# Patient Record
Sex: MALE | Race: BLACK OR AFRICAN AMERICAN | NOT HISPANIC OR LATINO | Employment: UNEMPLOYED | URBAN - METROPOLITAN AREA
[De-identification: names, ages, dates, MRNs, and addresses within clinical notes are randomized per-mention and may not be internally consistent; named-entity substitution may affect disease eponyms.]

---

## 2017-11-03 ENCOUNTER — TELEPHONE (OUTPATIENT)
Dept: GASTROENTEROLOGY | Facility: CLINIC | Age: 22
End: 2017-11-03

## 2017-11-03 NOTE — TELEPHONE ENCOUNTER
----- Message from Shruti Braxtonkert sent at 11/3/2017  9:36 AM CDT -----  Contact: karthik molina pediatric hospitalist/[facility] [phone]  lammi - visceral myopathy - wants to talk to you about his case  - please call karthik molina pediatric hospitalist/[facility] [phone]

## 2017-11-03 NOTE — TELEPHONE ENCOUNTER
PT w chronic intestinal pseudo-obstruction at [facility]. Unable to fu w Dr. Carbajal   Pls bring this pt into clinic next available.  Put him on a wait list   He will fu w his pediatric Gi until able to be seen in our clinic  Let ref MD know that we will see the pt     Call  to arrange apt [phone]

## 2017-11-06 ENCOUNTER — TELEPHONE (OUTPATIENT)
Dept: GASTROENTEROLOGY | Facility: CLINIC | Age: 22
End: 2017-11-06

## 2017-11-06 NOTE — TELEPHONE ENCOUNTER
----- Message from Ashley Barber sent at 11/6/2017  3:18 PM CST -----  Contact: Self- [phone]  Kayleigh pt is returning a missed call to Beatriz- please call pt back at [phone]

## 2017-11-06 NOTE — TELEPHONE ENCOUNTER
Spoke with GM and arranged new patient appointment.    Appointment was confirmed and appt slip mailed.    Patient has been added on wait list for future cancellation.

## 2018-01-15 ENCOUNTER — DOCUMENTATION ONLY (OUTPATIENT)
Dept: GASTROENTEROLOGY | Facility: CLINIC | Age: 23
End: 2018-01-15

## 2018-01-15 RX ORDER — METRONIDAZOLE 500 MG/1
TABLET ORAL
Refills: 0 | Status: ON HOLD | COMMUNITY
Start: 2017-11-04 | End: 2021-05-20 | Stop reason: HOSPADM

## 2018-01-15 RX ORDER — ERGOCALCIFEROL 1.25 MG/1
CAPSULE ORAL
Refills: 2 | Status: ON HOLD | COMMUNITY
Start: 2017-10-11 | End: 2023-07-31 | Stop reason: HOSPADM

## 2018-01-16 ENCOUNTER — TELEPHONE (OUTPATIENT)
Dept: GASTROENTEROLOGY | Facility: CLINIC | Age: 23
End: 2018-01-16

## 2018-01-16 NOTE — TELEPHONE ENCOUNTER
----- Message from Shruti Dimas sent at 1/16/2018  9:43 AM CST -----  Contact: gracie terry - grandmother - [phone]  flashmi - having his records transferred from [facility] to here - has questions - please call gracie terry - grandmother - [phone]

## 2018-01-16 NOTE — TELEPHONE ENCOUNTER
Spoke with patient's grandmother and gave her directions to [facility] on [facility].    Patient verbalizes understanding and will call if she needs any directions on Thursday.

## 2018-01-18 ENCOUNTER — TELEPHONE (OUTPATIENT)
Dept: GASTROENTEROLOGY | Facility: CLINIC | Age: 23
End: 2018-01-18

## 2018-01-18 NOTE — TELEPHONE ENCOUNTER
Spoke to Dequan, let him know due to weather we are rescheduling patients anyway.  Let him know he will receive a call from Dr. Valdes's assistant next week with his new appt time.     He verbalizes understanding and appreciates update.

## 2018-01-18 NOTE — TELEPHONE ENCOUNTER
----- Message from Karoline Hernandez MA sent at 1/18/2018  8:30 AM CST -----  Contact: Mobile: [phone]   [facility]  Due to weather, pt is not able to make the drive in today, please call to reschedule. Mobile: [phone]

## 2018-01-20 ENCOUNTER — TELEPHONE (OUTPATIENT)
Dept: SPORTS MEDICINE | Facility: CLINIC | Age: 23
End: 2018-01-20

## 2018-01-31 ENCOUNTER — TELEPHONE (OUTPATIENT)
Dept: GASTROENTEROLOGY | Facility: CLINIC | Age: 23
End: 2018-01-31

## 2018-01-31 NOTE — TELEPHONE ENCOUNTER
Patient's appointment rescheduled with Dr. Valdes.    Appointment confirmed and appointment slip mailed out.

## 2018-02-01 NOTE — TELEPHONE ENCOUNTER
Spoke with patient and rescheduled appointment from yesterday to a new day in march due to grandmother having to work that day.    Appointment confirmed and slip mailed out.

## 2018-03-26 ENCOUNTER — TELEPHONE (OUTPATIENT)
Dept: GASTROENTEROLOGY | Facility: CLINIC | Age: 23
End: 2018-03-26

## 2018-03-26 NOTE — TELEPHONE ENCOUNTER
----- Message from Shruti Dimas sent at 3/26/2018 12:37 PM CDT -----  Contact: self - [phone]  flashmi - returning your call - please call patient at

## 2018-03-27 ENCOUNTER — OFFICE VISIT (OUTPATIENT)
Dept: GASTROENTEROLOGY | Facility: CLINIC | Age: 23
End: 2018-03-27
Payer: MEDICAID

## 2018-03-27 VITALS
SYSTOLIC BLOOD PRESSURE: 90 MMHG | HEIGHT: 63 IN | HEART RATE: 81 BPM | DIASTOLIC BLOOD PRESSURE: 67 MMHG | BODY MASS INDEX: 16.83 KG/M2 | WEIGHT: 95 LBS

## 2018-03-27 DIAGNOSIS — R10.32 LEFT LOWER QUADRANT PAIN: Primary | ICD-10-CM

## 2018-03-27 DIAGNOSIS — K59.89 INTESTINAL PSEUDO OBSTRUCTION: ICD-10-CM

## 2018-03-27 DIAGNOSIS — R19.7 DIARRHEA, UNSPECIFIED TYPE: ICD-10-CM

## 2018-03-27 DIAGNOSIS — R63.4 WEIGHT LOSS: ICD-10-CM

## 2018-03-27 DIAGNOSIS — K59.00 CONSTIPATION, UNSPECIFIED CONSTIPATION TYPE: ICD-10-CM

## 2018-03-27 PROCEDURE — 99205 OFFICE O/P NEW HI 60 MIN: CPT | Mod: S$PBB,,, | Performed by: INTERNAL MEDICINE

## 2018-03-27 PROCEDURE — 99999 PR PBB SHADOW E&M-EST. PATIENT-LVL III: CPT | Mod: PBBFAC,,, | Performed by: INTERNAL MEDICINE

## 2018-03-27 PROCEDURE — 99213 OFFICE O/P EST LOW 20 MIN: CPT | Mod: PBBFAC | Performed by: INTERNAL MEDICINE

## 2018-03-27 RX ORDER — ONDANSETRON 4 MG/1
4 TABLET, FILM COATED ORAL
Status: ON HOLD | COMMUNITY
End: 2021-05-20 | Stop reason: HOSPADM

## 2018-03-27 NOTE — PROGRESS NOTES
[facility] Consultation Note    Reason for Consult:    Chief Complaint   Patient presents with    Nausea    Emesis    Abdominal Pain    Gas    Bloated    Constipation         PCP:   Teodoro Erazo   [address] / [facility] [address]    Referring MD:  Anisa Krishna Md  [address]  [place] [address]    GI: Dr. Krueger ([facility] GI). Dr. Putnam ([facility])    HPI:  Dequan Holly is a 23 y.o. male with a PMH of intestinal pseudo obstruction, short gut, anemia, Vit A deficiency, polyneuropathy, nephrolithiasis and bilat hydronephrosis, recurrent UTI, scoliosis, osteopenia, anxiety depression  referred to motility clinic for second opinion regarding the following problems:    Nausea.  Some early satiety  Frequency:varies. Lately 1-2 days monthly  Onset: 2006 after mom passed away. Improvement this year after eating healthier    Vomiting  Frequency:varies.  3 times this month  Onset:2006 after mom passes away.   Timing of vomiting:  Within 30 min of eating:no   Within 3 hours after eating: yes    Cyclical episodes of n/v with symptom free intervals between episodes:can go 3-4 weeks without n/v. Then n/v couple times daily x one day.   History of migraines:frequent HAs. MGM with stress migraines  Marijuana use:h/o marijuana during 16 yrs old, then 21 years old. None since  Unusual bathing behaviors: 2-3 hot baths daily for s/s relief.    Some improvement with zofran 4 mg PRN (few times monthly)  Some  improvement with phenergan 25 mg in the past  Some improvement with erythromycin during hospital admit  Reglan as impatient. Cannot recall.   Has not tried compazine, domperidone, scopolamine patch.      Gastroparesis diagnosed:  GES in 11/2017 with significant delay. Pt reports never told he has gastroparesis.  Etiology:    Dm  Idopathic  Postsurgical  Parkinsonism-no  Amyloidosis-no  Paraneoplastic disease-no  Scleroderma-no  Mesenteric  ischemia-no    History of:  Prior gastric or bariatric surgery: no  Diabetes mellitus: no  Thyroid dysfunction: no  Neurological disease: no   Autoimmune disorders: no    Number of GP related hospitalization in the past year: none  Number of GP related ED visit in the past year: none  Hospital visits for wt loss and UTI    Interventions: (pyloroplasty, botox, GP diet):none.   No Gastric Stimulator         Curenlty on following medications that may affect gastric emptying:   Narcotics (morphine, oxycodone, tapentadol, less with tramadol):no  Anticholinergics: no  Cyclosporine:no  Diabetic medications (GLP-1 analogs, Exenatide, Lixisenatide, Livaglutide, Albiglutide, DulaglutatidePramlintide - SymlinPen (injection)):no    Abdominal pain. Reports abdominal pain  Character:stabbing  Location:LLQ  Frequency:several days weekly    Duration:couple hours-1 day  Onset:since childhood  Worse with:maybe worse with meals.   Improves with:nothing in particular  Associated with Bm: worse with diarrhea  Nocturnal pain: sometimes  Has not tried Bentyl, Levsin, Levbid, ibgard  Antidepressants:no  Using narcotic pain medication: no   Naproxen 2 daily for chronic body pain    Gas and bloating.   Bloating: yes  Excessive gas: yes  Abdominal distension: yes   Belching: yes  Symptoms get worse after meals:yes  Symptoms get worse as the day progresses:  no  Consumes lactose:yes  Consumes artificial sugars:no  Some improvement with gas ex.    Treating  with flagyl 500 mg BID x 2008 for SIBO/toprevent SIBO with minimal relief  Xifaxan made bloating worse    Constipation. 5-7 liquid stools daily. Taking OTC stool softener once every two weeks. Constipation = 2 days w/o BM. Nocturnal stools. No incontinence.     Weight loss. Weight fluctuates for the last 15 years. Recently 8lb wt loss in a few weeks. TPN in 2007 x 1 year with weight gain. H/o Pediasure- caused heartburn, ensure clear in the past with some improvement and tolerated well.      Anxiety. Depression. Sees  at [facility] (psychatrist). Meds advised but pt refuses d/t messes with thought process (unable to multitask). Therapy once monthly. Has tried respiradol, abilify, zyprexa    Insomnia. Intermittent. No improvement with Melatonin,  z quil. Has not seen sleep specialist.    Weakness x several years. Hand contracture. Told it was d/t muscle mass lost r/t malnutrition/wt loss. Seen by Dr. Fan and Dr. Tejeda with [facility] neurology today.     Chr idiopathy psuedo obs (chips) mother and aunt .aunt  at 28, pt  in      Denies dysphagia, GERD,  diarrhea, constipation, BRBPR, melena,     Total visit time was 90 minutes, more than 50% of which was spent in face-to-face counseling with patient regarding symptoms, diagnostic results, prognosis, risks and benefits of treatment options, instructions for management, importance of compliance with chosen treatment options, risk factor reduction, stress reduction, coping strategies.      Previous Studies:   GES 11/3/2017: significant delay in gastric emptying. 59% retention at 4 hrs. t ½ of 215 minutes. mildly altered shape of the stomach 2/2 spine curvature and mass effect from adjacent dilated small bowel.   abd xray KUB 10/26/2017: worsening distended loops of bowel. Multiple air fluid levels.  CT abd 10/26/2017: distal SB obstruction w/ transition point in right abd and proximal fecalized contents. Questionable bowel gas in loops of SB concerning for developing ischemia 2/2 obstruction. Dilation of bilat renal collecting systems seen again. Non obstructing kidney stones bilat. Dilated left ureter again seen. Dilated bladder. Midline cystic structure in expected location of the prostatic uterus. Dilated distal esophagus and stomach.   Abd xray KUB 10/23/2017:dilated loops of small and large bowel suspect developing obstruction.   Ekg 10/23/2017: NL  Labs 2017: WBC NL. RBC Low 3.7. H/H low 10.0/29.9.  MPV high 10.7. eosinophils  high 10. Skyler low 8.0.  pre albumin low 12.5  Labs 10/23/2017: sed rate high 30. TSH/T4 NL.  Pre albumin low 19.0. TTG IgA NL. IGA high 594.0. stool ova/cyst/parasites x1 (-).  *per inpatient hospital progress note:  pt admitted to hospital in 10/2017 for abd pain/distention/vomiting. d/x with SBO. Treated with IV fluids, NG tube decompression (drained bilious fluid). Advised against sx and to f/u with adult GI.  *Treated for SIBO with flagyl followed by Xifaxan in 10/2017. xifaxan d/c d/t worse abd distention  *Several abx courses d/t recurrent UTI. Self caths.    ROS:  ROS   Constitutional: No fevers, no chills, no night sweats, no weight loss  ENT: No congestion, no rhinorrhea, no chronic sinus problems  CV: No chest pain, no palpitations  Pulm: No cough, no shortness of breath  Ophtho: No blurry vision, no eye redness  GI: see HPI  Derm: No rash  Heme: No lymphadenopathy, no bruising  MSK: No arthritis, no joint swelling, no Raynauds  : No dysuria, no frequent urination, no blood in urine  Endo: No hot or cold intolerance  Neuro: No dizziness, no syncope, no seizure  Psych: No anxiety, no depression        Medical History:   Past Medical History:   Diagnosis Date    Anemia     Bilateral hydronephrosis     Nephrolithiasis     Osteopenia     Scoliosis     UTI (urinary tract infection)     Vitamin A deficiency     Weight loss         Surgical History:   Past Surgical History:   Procedure Laterality Date    central line placement          Family History:   Family History   Problem Relation Age of Onset    Colon cancer Neg Hx     Rectal cancer Neg Hx     Stomach cancer Neg Hx     Celiac disease Neg Hx     Irritable bowel syndrome Neg Hx     Ulcerative colitis Neg Hx     Crohn's disease Neg Hx     Esophageal cancer Neg Hx         Social History:   Social History     Social History    Marital status: Single     Spouse name: N/A    Number of children: N/A    Years of education: N/A     Social  "History Main Topics    Smoking status: Never Smoker    Smokeless tobacco: Never Used    Alcohol use Yes      Comment: once weekly    Drug use: No    Sexual activity: Not Asked     Other Topics Concern    None     Social History Narrative    None        Review of patient's allergies indicates:   Allergen Reactions    Ceftriaxone Itching and Swelling    Sulfamethoxazole     Trimethoprim        Current Outpatient Prescriptions   Medication Sig Dispense Refill    ergocalciferol (ERGOCALCIFEROL) 50,000 unit Cap TK 1 C PO Q 7 DAYS  2    metroNIDAZOLE (FLAGYL) 500 MG tablet TK 2 TS PO BID  0    MULTIVIT-MINERALS/FERROUS FUM (MULTI VITAMIN ORAL) Take by mouth once daily.      ondansetron (ZOFRAN) 4 MG tablet Take 4 mg by mouth.       No current facility-administered medications for this visit.         Objective Findings:  Vital Signs:  BP 90/67   Pulse 81   Ht 5' 3" (1.6 m)   Wt 43.1 kg (95 lb 0.3 oz)   BMI 16.83 kg/m²   Body mass index is 16.83 kg/m².    Physical Exam:  General appearance: alert, cooperative, no distress  HENT: Normocephalic, atraumatic, neck symmetrical, no nasal discharge  Eyes: conjunctivae/corneas clear, PERRL, EOM's intact  Lungs: clear to auscultation bilaterally, no dullness to percussion bilaterally  Heart: regular rate and rhythm without rub; no displacement of the PMI  Abdomen: soft, non-tender; bowel sounds normoactive; no organomegaly  Extremities: extremities symmetric; no clubbing, cyanosis, or edema  Integument: Skin color, texture, turgor normal; no rashes; hair distrubution normal  Neurologic: Alert and oriented X 3, Upper and lower ext weakness, B/L hand contaractipns.  abnormal coordination and gait  Psychiatric: no pressured speech; depressedaffect; no evidence of impaired cognition    Labs:  No results found for: WBC, HGB, HCT, MCV, PLT  No results found for: FERRITIN  No results found for: NA, K, CL, CO2, GLU, BUN, CREATININE, CALCIUM, PROT, ALBUMIN, BILITOT, " ALKPHOS, AST, ALT  No results found for: TSH  No results found for: SEDRATE  No results found for: CRP  No results found for: LABA1C, HGBA1C        Assessment and Plan:  Dequan Holly is a 23 y.o. male with PMH of chronic intestinal pseudoobstruction, ? short gut, anemia, Vit A deficiency, polyneuropathy, nephrolithiasis and bilateral hydronephrosis, recurrent UTI, scoliosis, osteopenia, anxiety depression referred to motility clinic for second opinion regarding the following problems:    Nausea. Vomiting.  Early satiety. Gastroparesis/CIPO (unclear etiology, GES delayed in 11/2017)  -EGD  -CTE  -labs   -Zofran 4-8 mg PRN   -Phenergan 25 mg prn  -Obtain records from Peds GI   -Will consider intermittent erythromycin  -Will consider reglan, cisapride    Abdominal pain in LLQ, associated w Bms  -Would avoid Bentyl, Levsin, Levbid  -Will consider Ibgard  -On Naproxen 2 daily for chronic body pain, will consider stopping      Gas and bloating. SIBO  -Xifaxan made bloating worse  -Gas ex prn.  -On flagyl 500 mg BID since 2008 for SIBO with minimal relief, will consider stopping or cycling antibiotics     Constipation.  Olya reports that he is having constipaiton, because sometimes he has no BM for two days.  Overall he reports loose stools daily.   -Taking OTC stool softener once every two weeks prn.     Diarrhea.   5-7 liquid stools daily.  -Colonoscopy     Weight loss. Weight fluctuates for the last 15 years. Recently 8lb wt loss in a few weeks.   -Unable to tolerate Pediasure due to GERD    Vit A deficiency.  Malnutrition. ? Short gut syndrome. TPN in 2007 x 1 year.   -Will assess for malnutrition at next visit     Anxiety. Depression.   -Previously on respiradol, abilify, zyprexa  -Sees Dr. Aguirre at [facility] (psychatrist).   -Refuses to take medication due to cognitive impairment   -Sees a therapist once monthly.     Peripheral weakness x several years. Hand contracture. Told it was due to muscle mass lost from  malnutrition and weight loss.   -Followed by Dr. Fan and Dr. Tejeda with [facility] neurology.  Need to obtain records of neurological diagnosis.        Family history of chronic intestinal pseudo-obstruction (mother and maternal aunt  at early age, due to complications)    Follow-up in about 2 months (around 2018). w Dr. Valdes     1. Left lower quadrant pain    2. Constipation, unspecified constipation type    3. Diarrhea, unspecified type    4. Intestinal pseudo obstruction    5. Weight loss          Order summary:  Orders Placed This Encounter    CT Enterography Abd_Pelvis With Contrast    CBC auto differential    Comprehensive metabolic panel    TSH    TISSUE TRANSGLUTAMINASE (TTG), IGA    IgA    Case request GI: Colonoscopy, ESOPHAGOGASTRODUODENOSCOPY (EGD)         Thank you so much for allowing me to participate in the care of Kee Zapien, NP  Brenda Valdes MD

## 2018-03-27 NOTE — LETTER
March 31, 2018      Anisa Krishna MD  [address]  [address]  [facility] [address]           [facility]  [address]  [facility] [address]  Phone: [phone]  Fax: [phone]          Patient: Dequan Holly   MR Number: [id number]   YOB: 1995   Date of Visit: 3/27/2018       Dear Dr. Anisa Krishna:    Thank you for referring Dequan Holly to me for evaluation. Attached you will find relevant portions of my assessment and plan of care.    If you have questions, please do not hesitate to call me. I look forward to following Dequan Holly along with you.    Sincerely,    Brenda Valdes MD    Enclosure  CC:  No Recipients    If you would like to receive this communication electronically, please contact [email or web address] or [phone] to request more information on [employer] Link access.    For providers and/or their staff who would like to refer a patient to [facility], please contact us through our one-stop-shop provider referral line, [facility], at [phone].    If you feel you have received this communication in error or would no longer like to receive these types of communications, please e-mail [email or web address]

## 2018-04-17 ENCOUNTER — TELEPHONE (OUTPATIENT)
Dept: ENDOSCOPY | Facility: HOSPITAL | Age: 23
End: 2018-04-17

## 2018-04-17 ENCOUNTER — TELEPHONE (OUTPATIENT)
Dept: GASTROENTEROLOGY | Facility: CLINIC | Age: 23
End: 2018-04-17

## 2018-04-17 DIAGNOSIS — R63.4 WEIGHT LOSS: Primary | ICD-10-CM

## 2018-04-17 RX ORDER — POLYETHYLENE GLYCOL 3350, SODIUM SULFATE ANHYDROUS, SODIUM BICARBONATE, SODIUM CHLORIDE, POTASSIUM CHLORIDE 236; 22.74; 6.74; 5.86; 2.97 G/4L; G/4L; G/4L; G/4L; G/4L
4 POWDER, FOR SOLUTION ORAL ONCE
Qty: 4000 ML | Refills: 0 | Status: SHIPPED | OUTPATIENT
Start: 2018-04-17 | End: 2018-04-17

## 2018-04-17 NOTE — TELEPHONE ENCOUNTER
Spoke with patient's grandmother, she reports patient is ready to schedule procedures.    Patient transferred to schedulers.

## 2018-04-17 NOTE — TELEPHONE ENCOUNTER
----- Message from Shruti Wing sent at 4/17/2018  2:02 PM CDT -----  Contact: gianna terry - mother - [phone]  [facility] - returning your call - please call gianna terry - mother - [phone]

## 2018-05-14 ENCOUNTER — ANESTHESIA (OUTPATIENT)
Dept: ENDOSCOPY | Facility: HOSPITAL | Age: 23
End: 2018-05-14
Payer: MEDICAID

## 2018-05-14 ENCOUNTER — HOSPITAL ENCOUNTER (OUTPATIENT)
Facility: HOSPITAL | Age: 23
Discharge: HOME | End: 2018-05-14
Attending: INTERNAL MEDICINE | Admitting: INTERNAL MEDICINE
Payer: MEDICAID

## 2018-05-14 ENCOUNTER — TELEPHONE (OUTPATIENT)
Dept: GASTROENTEROLOGY | Facility: CLINIC | Age: 23
End: 2018-05-14

## 2018-05-14 ENCOUNTER — ANESTHESIA EVENT (OUTPATIENT)
Dept: ENDOSCOPY | Facility: HOSPITAL | Age: 23
End: 2018-05-14
Payer: MEDICAID

## 2018-05-14 VITALS
DIASTOLIC BLOOD PRESSURE: 59 MMHG | SYSTOLIC BLOOD PRESSURE: 100 MMHG | TEMPERATURE: 98 F | BODY MASS INDEX: 17.48 KG/M2 | OXYGEN SATURATION: 97 % | HEART RATE: 105 BPM | HEIGHT: 62 IN | WEIGHT: 95 LBS | RESPIRATION RATE: 18 BRPM

## 2018-05-14 DIAGNOSIS — R10.9 ABDOMINAL PAIN: ICD-10-CM

## 2018-05-14 DIAGNOSIS — R10.9 ABDOMINAL PAIN, UNSPECIFIED ABDOMINAL LOCATION: Primary | ICD-10-CM

## 2018-05-14 DIAGNOSIS — B37.81 CANDIDA ESOPHAGITIS: Primary | ICD-10-CM

## 2018-05-14 PROCEDURE — 63600175 PHARM REV CODE 636 W HCPCS: Performed by: NURSE ANESTHETIST, CERTIFIED REGISTERED

## 2018-05-14 PROCEDURE — 43235 EGD DIAGNOSTIC BRUSH WASH: CPT | Mod: 52,,, | Performed by: INTERNAL MEDICINE

## 2018-05-14 PROCEDURE — 00731 ANES UPR GI NDSC PX NOS: CPT | Performed by: INTERNAL MEDICINE

## 2018-05-14 PROCEDURE — 43235 EGD DIAGNOSTIC BRUSH WASH: CPT | Performed by: INTERNAL MEDICINE

## 2018-05-14 PROCEDURE — 25000003 PHARM REV CODE 250: Performed by: INTERNAL MEDICINE

## 2018-05-14 PROCEDURE — E9220 PRA ENDO ANESTHESIA: HCPCS | Mod: ,,, | Performed by: NURSE ANESTHETIST, CERTIFIED REGISTERED

## 2018-05-14 PROCEDURE — 37000008 HC ANESTHESIA 1ST 15 MINUTES: Performed by: INTERNAL MEDICINE

## 2018-05-14 PROCEDURE — 37000009 HC ANESTHESIA EA ADD 15 MINS: Performed by: INTERNAL MEDICINE

## 2018-05-14 RX ORDER — PROPOFOL 10 MG/ML
VIAL (ML) INTRAVENOUS
Status: DISCONTINUED | OUTPATIENT
Start: 2018-05-14 | End: 2018-05-14

## 2018-05-14 RX ORDER — SODIUM CHLORIDE 9 MG/ML
INJECTION, SOLUTION INTRAVENOUS CONTINUOUS
Status: DISCONTINUED | OUTPATIENT
Start: 2018-05-14 | End: 2018-05-14 | Stop reason: HOSPADM

## 2018-05-14 RX ORDER — PROPOFOL 10 MG/ML
VIAL (ML) INTRAVENOUS CONTINUOUS PRN
Status: DISCONTINUED | OUTPATIENT
Start: 2018-05-14 | End: 2018-05-14

## 2018-05-14 RX ORDER — LIDOCAINE HCL/PF 100 MG/5ML
SYRINGE (ML) INTRAVENOUS
Status: DISCONTINUED | OUTPATIENT
Start: 2018-05-14 | End: 2018-05-14

## 2018-05-14 RX ORDER — FLUCONAZOLE 200 MG/1
200 TABLET ORAL DAILY
Qty: 14 TABLET | Refills: 0 | Status: SHIPPED | OUTPATIENT
Start: 2018-05-14 | End: 2018-05-28

## 2018-05-14 RX ADMIN — PROPOFOL 200 MCG/KG/MIN: 10 INJECTION, EMULSION INTRAVENOUS at 01:05

## 2018-05-14 RX ADMIN — LIDOCAINE HYDROCHLORIDE 80 MG: 20 INJECTION, SOLUTION INTRAVENOUS at 01:05

## 2018-05-14 RX ADMIN — SODIUM CHLORIDE: 9 INJECTION, SOLUTION INTRAVENOUS at 12:05

## 2018-05-14 RX ADMIN — PROPOFOL 100 MG: 10 INJECTION, EMULSION INTRAVENOUS at 01:05

## 2018-05-14 NOTE — H&P
Short Stay Endoscopy History and Physical    PCP - Teodoro Erazo MD     Procedure - EGD/colon  ASA - per anesthesia  Mallampati - per anesthesia  History of Anesthesia problems - no  Family history Anesthesia problems -  no   Plan of anesthesia - General    HPI:  This is a 23 y.o. male here for evaluation of abd pain, nausea, vomiting, diarrhea, weight loss.      ROS:  Constitutional: No fevers, chills, No weight loss  CV: No chest pain  Pulm: No cough, No shortness of breath  Ophtho: No vision changes  GI: see HPI  Derm: No rash    Medical History:  has a past medical history of Anemia; Bilateral hydronephrosis; Nephrolithiasis; Osteopenia; Scoliosis; UTI (urinary tract infection); Vitamin A deficiency; and Weight loss.    Surgical History:  has a past surgical history that includes central line placement.    Family History: family history is not on file.. Otherwise no colon cancer, inflammatory bowel disease, or GI malignancies.    Social History:  reports that he has never smoked. He has never used smokeless tobacco. He reports that he drinks alcohol. He reports that he does not use drugs.    Review of patient's allergies indicates:   Allergen Reactions    Ceftriaxone Itching and Swelling    Sulfamethoxazole     Trimethoprim        Medications:   Prescriptions Prior to Admission   Medication Sig Dispense Refill Last Dose    ergocalciferol (ERGOCALCIFEROL) 50,000 unit Cap TK 1 C PO Q 7 DAYS  2 Past Week at Unknown time    metroNIDAZOLE (FLAGYL) 500 MG tablet TK 2 TS PO BID  0 5/13/2018 at Unknown time    MULTIVIT-MINERALS/FERROUS FUM (MULTI VITAMIN ORAL) Take by mouth once daily.   5/13/2018 at Unknown time    ondansetron (ZOFRAN) 4 MG tablet Take 4 mg by mouth.   Past Month at Unknown time       Physical Exam:    Vital Signs:   Vitals:    05/14/18 1218   BP: (!) 101/51   Pulse: 71   Resp: 16   Temp: 97.9 °F (36.6 °C)       General Appearance: Well appearing in no acute distress  Eyes:    No scleral  icterus  Lungs: CTA anteriorly  Heart:  Regular rate, S1, S2 normal, no murmurs heard.  Abdomen: Soft, non tender, non distended with normal bowel sounds. No hepatosplenomegaly, ascites, or mass.  Extremities: No edema  Skin: No rash    Labs:  No results found for: WBC, HGB, HCT, PLT, CHOL, TRIG, HDL, LDLDIRECT, ALT, AST, NA, K, CL, CREATININE, BUN, CO2, TSH, PSA, INR, GLUF, HGBA1C, MICROALBUR    I have explained the risks and benefits of endoscopy procedures to the patient including but not limited to bleeding, perforation, infection, and death.      Brenda Valdes MD

## 2018-05-14 NOTE — PROGRESS NOTES
Patient HR tachycardic, 111-120. Notified Anesthesia. Patient also complaining of burning sensation in his esophagus. Notified Dr. Valdes, GI cocktail ordered. Will administer and continue to monitor.

## 2018-05-14 NOTE — PROVATION PATIENT INSTRUCTIONS
Discharge Summary/Instructions after an Endoscopic Procedure  Patient Name: Dequan Holly  Patient MRN: [id number]  Patient YOB: 1995  Monday, May 14, 2018  Brenda Valdes MD  RESTRICTIONS:  During your procedure today, you received medications for sedation.  These   medications may affect your judgment, balance and coordination.  Therefore,   for 24 hours, you have the following restrictions:   - DO NOT drive a car, operate machinery, make legal/financial decisions,   sign important papers or drink alcohol.    ACTIVITY:  The following day: return to full activity including work, except no heavy   lifting, straining or running for 3 days if polyps were removed.  DIET:  Eat and drink normally unless instructed otherwise.     TREATMENT FOR COMMON SIDE EFFECTS:  - Mild abdominal pain, nausea, belching, bloating or excessive gas:  rest,   eat lightly and use a heating pad.  - Sore Throat: treat with throat lozenges and/or gargle with warm salt   water.  - Because air was used during the procedure, expelling large amounts of air   from your rectum or belching is normal.  - If a bowel prep was taken, you may not have a bowel movement for 1-3 days.    This is normal.  SYMPTOMS TO WATCH FOR AND REPORT TO YOUR PHYSICIAN:  1. Abdominal pain or bloating, other than gas cramps.  2. Chest pain.  3. Back pain.  4. Signs of infection such as: chills or fever occurring within 24 hours   after the procedure.  5. Rectal bleeding, which would show as bright red, maroon, or black stools.   (A tablespoon of blood from the rectum is not serious, especially if   hemorrhoids are present.)  6. Vomiting.  7. Weakness or dizziness.  GO DIRECTLY TO THE NEAREST EMERGENCY ROOM IF YOU HAVE ANY OF THE FOLLOWING:      Difficulty breathing              Chills and/or fever over 101 F   Persistent vomiting and/or vomiting blood   Severe abdominal pain   Severe chest pain   Black, tarry stools   Bleeding- more than one tablespoon   Any  other symptom or condition that you feel may need urgent attention  Your doctor recommends these additional instructions:  If any biopsies were taken, your doctors clinic will contact you in 1 to 2   weeks with any results.  - Discharge patient to home (with escort).   - Resume previous diet.   - Continue present medications.   - The findings and recommendations were discussed with the patient.   - Start Fluconazole 200mg daily for 14 days  - Patient has a contact number available for emergencies.  The signs and   symptoms of potential delayed complications were discussed with the   patient.  Return to normal activities tomorrow.  Written discharge   instructions were provided to the patient.   - Resume anticoagulant at prior dose.  For questions, problems or results please call your physician - Brenda Valdes MD at Work:  [phone].  [facility], EMERGENCY ROOM PHONE NUMBER: [phone]  IF A COMPLICATION OR EMERGENCY SITUATION ARISES AND YOU ARE UNABLE TO REACH   YOUR PHYSICIAN - GO DIRECTLY TO THE EMERGENCY ROOM.  Brenda Valdes MD  5/14/2018 1:38:10 PM  This report has been verified and signed electronically.

## 2018-05-14 NOTE — ANESTHESIA PREPROCEDURE EVALUATION
05/14/2018  Dequan Holly is a 23 y.o., male.  Past Medical History:   Diagnosis Date    Anemia     Bilateral hydronephrosis     Nephrolithiasis     Osteopenia     Scoliosis     UTI (urinary tract infection)     Vitamin A deficiency     Weight loss      Past Surgical History:   Procedure Laterality Date    central line placement           Anesthesia Evaluation    I have reviewed the Patient Summary Reports.     I have reviewed the Medications.     Review of Systems  Anesthesia Hx:  No problems with previous Anesthesia Denies Hx of Anesthetic complications  Neg history of prior surgery. Denies Family Hx of Anesthesia complications.   Denies Personal Hx of Anesthesia complications.   Social:  Former Smoker, No Alcohol Use        Physical Exam  General:  Well nourished    Airway/Jaw/Neck:  Airway Findings: Mouth Opening: Normal Tongue: Normal  General Airway Assessment: Adult  Mallampati: I  TM Distance: Normal, at least 6 cm         Dental:  Dental Findings: Upper Dentures   Chest/Lungs:  Chest/Lungs Clear    Heart/Vascular:  Heart Findings: Normal       Mental Status:  Mental Status Findings:  Alert and Oriented         Anesthesia Plan  Type of Anesthesia, risks & benefits discussed:  Anesthesia Type:  general  Patient's Preference:   Intra-op Monitoring Plan: standard ASA monitors  Intra-op Monitoring Plan Comments:   Post Op Pain Control Plan:   Post Op Pain Control Plan Comments:   Induction:   IV  Beta Blocker:  Patient is not currently on a Beta-Blocker (No further documentation required).       Informed Consent: Patient understands risks and agrees with Anesthesia plan.  Questions answered. Anesthesia consent signed with patient.  ASA Score: 2     Day of Surgery Review of History & Physical:    H&P update referred to the provider.         Ready For Surgery From Anesthesia Perspective.

## 2018-05-14 NOTE — TELEPHONE ENCOUNTER
EGD/colonoscopy aborted due to large amount of liquid in the stomach.  Semi solid stool noted.  PT unable to complete the prep due to nausea.  EGD shows candida esophagitis.  Will stat fluconazole 200mg daily for 14 days.  Please obtain records from [facility].

## 2018-05-14 NOTE — TRANSFER OF CARE
"Anesthesia Transfer of Care Note    Patient: Dequan Holly    Procedure(s) Performed: Procedure(s) (LRB):  Colonoscopy (N/A)  ESOPHAGOGASTRODUODENOSCOPY (EGD) (N/A)    Patient location: [facility]    Anesthesia Type: general    Transport from OR: Transported from OR on room air with adequate spontaneous ventilation    Post pain: adequate analgesia    Post assessment: no apparent anesthetic complications and tolerated procedure well    Post vital signs: stable    Level of consciousness: awake    Nausea/Vomiting: no nausea/vomiting    Complications: none    Transfer of care protocol was followed      Last vitals:   Visit Vitals  BP (!) 101/51 (Patient Position: Lying)   Pulse 71   Temp 36.6 °C (97.9 °F) (Temporal)   Resp 16   Ht 5' 2" (1.575 m)   Wt 43.1 kg (95 lb)   SpO2 98%   BMI 17.38 kg/m²     "

## 2018-05-14 NOTE — ANESTHESIA POSTPROCEDURE EVALUATION
"Anesthesia Post Evaluation    Patient: Dequan Holly    Procedure(s) Performed: Procedure(s) (LRB):  Colonoscopy (N/A)  ESOPHAGOGASTRODUODENOSCOPY (EGD) (N/A)    Final Anesthesia Type: general  Patient location during evaluation: GI PACU  Patient participation: Yes- Able to Participate  Level of consciousness: awake and alert and oriented  Post-procedure vital signs: reviewed and stable  Pain management: adequate  Airway patency: patent  PONV status at discharge: No PONV  Anesthetic complications: no      Cardiovascular status: hemodynamically stable  Respiratory status: unassisted, spontaneous ventilation and room air  Hydration status: euvolemic  Follow-up not needed.        Visit Vitals  BP (!) 100/59   Pulse 105   Temp 36.7 °C (98.1 °F) (Temporal)   Resp 18   Ht 5' 2" (1.575 m)   Wt 43.1 kg (95 lb)   SpO2 97%   BMI 17.38 kg/m²       Pain/Ilana Score: Pain Assessment Performed: Yes (5/14/2018  2:01 PM)  Presence of Pain: denies (5/14/2018  2:01 PM)  Ilana Score: 10 (5/14/2018  2:01 PM)      "

## 2018-05-21 ENCOUNTER — TELEPHONE (OUTPATIENT)
Dept: ENDOSCOPY | Facility: HOSPITAL | Age: 23
End: 2018-05-21

## 2021-05-16 PROBLEM — R45.851 DEPRESSION WITH SUICIDAL IDEATION: Status: ACTIVE | Noted: 2021-05-16

## 2021-05-16 PROBLEM — F32.A DEPRESSION WITH SUICIDAL IDEATION: Status: ACTIVE | Noted: 2021-05-16

## 2021-05-18 PROBLEM — R63.8 INADEQUATE ORAL INTAKE: Status: ACTIVE | Noted: 2021-05-18

## 2021-05-20 PROBLEM — E87.20 METABOLIC ACIDOSIS: Status: ACTIVE | Noted: 2021-05-20

## 2021-05-21 PROBLEM — M24.549 CONTRACTURE OF HAND: Status: ACTIVE | Noted: 2021-05-21

## 2023-07-27 PROBLEM — E85.9 AMYLOIDOSIS: Status: ACTIVE | Noted: 2023-07-27

## 2023-07-27 PROBLEM — Z86.711 HISTORY OF PULMONARY EMBOLISM: Status: ACTIVE | Noted: 2023-07-27

## 2023-07-27 PROBLEM — K90.829 SHORT GUT SYNDROME: Status: ACTIVE | Noted: 2023-07-27

## 2023-07-27 PROBLEM — R10.9 ABDOMINAL PAIN: Status: RESOLVED | Noted: 2018-05-14 | Resolved: 2023-07-27

## 2023-07-27 PROBLEM — N39.0 URINARY TRACT INFECTION WITHOUT HEMATURIA: Status: ACTIVE | Noted: 2023-07-27

## 2023-07-27 PROBLEM — R74.01 TRANSAMINITIS: Status: ACTIVE | Noted: 2023-07-27

## 2023-10-30 PROBLEM — N39.0 URINARY TRACT INFECTION WITHOUT HEMATURIA: Status: RESOLVED | Noted: 2023-07-27 | Resolved: 2023-10-30

## 2024-01-12 NOTE — TELEPHONE ENCOUNTER
----- Message from Lina Heaton sent at 1/12/2024  1:24 PM EST -----  Subject: Results Request    QUESTIONS  Results: lab and x-ray; Ordered by:   Date Performed:   ---------------------------------------------------------------------------  --------------  CALL BACK INFO    [phone]; Do not leave any message, patient will call back for answer  ---------------------------------------------------------------------------  --------------   Spoke with patient via phone.